# Patient Record
Sex: FEMALE | Race: OTHER
[De-identification: names, ages, dates, MRNs, and addresses within clinical notes are randomized per-mention and may not be internally consistent; named-entity substitution may affect disease eponyms.]

---

## 2018-01-27 ENCOUNTER — HOSPITAL ENCOUNTER (EMERGENCY)
Dept: HOSPITAL 54 - ER | Age: 73
Discharge: HOME | End: 2018-01-27
Payer: MEDICARE

## 2018-01-27 VITALS — DIASTOLIC BLOOD PRESSURE: 66 MMHG | SYSTOLIC BLOOD PRESSURE: 129 MMHG

## 2018-01-27 VITALS — BODY MASS INDEX: 29.45 KG/M2 | HEIGHT: 60 IN | WEIGHT: 150 LBS

## 2018-01-27 DIAGNOSIS — I10: ICD-10-CM
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
## 2018-01-27 DIAGNOSIS — Z88.0: ICD-10-CM
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
## 2018-01-27 DIAGNOSIS — S86.911A: Primary | ICD-10-CM
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
## 2018-01-27 DIAGNOSIS — Z79.82: ICD-10-CM

## 2018-01-27 PROCEDURE — Z7610: HCPCS

## 2018-01-27 PROCEDURE — A4606 OXYGEN PROBE USED W OXIMETER: HCPCS

## 2018-04-17 ENCOUNTER — HOSPITAL ENCOUNTER (EMERGENCY)
Dept: HOSPITAL 54 - ER | Age: 73
Discharge: HOME | End: 2018-04-17
Payer: MEDICARE

## 2018-04-17 VITALS — DIASTOLIC BLOOD PRESSURE: 97 MMHG | SYSTOLIC BLOOD PRESSURE: 126 MMHG

## 2018-04-17 VITALS — HEIGHT: 60 IN | BODY MASS INDEX: 29.45 KG/M2 | WEIGHT: 150 LBS

## 2018-04-17 DIAGNOSIS — F32.9: ICD-10-CM

## 2018-04-17 DIAGNOSIS — R59.0: Primary | ICD-10-CM

## 2018-04-17 DIAGNOSIS — I50.9: ICD-10-CM

## 2018-04-17 DIAGNOSIS — I11.0: ICD-10-CM

## 2018-04-17 DIAGNOSIS — Z79.82: ICD-10-CM

## 2018-04-17 DIAGNOSIS — Z88.0: ICD-10-CM

## 2018-04-17 DIAGNOSIS — F41.9: ICD-10-CM

## 2018-04-17 PROCEDURE — Z7610: HCPCS

## 2018-04-17 PROCEDURE — A4606 OXYGEN PROBE USED W OXIMETER: HCPCS

## 2018-12-21 ENCOUNTER — HOSPITAL ENCOUNTER (EMERGENCY)
Dept: HOSPITAL 54 - ER | Age: 73
LOS: 1 days | Discharge: HOME | End: 2018-12-22
Payer: MEDICARE

## 2018-12-21 VITALS — BODY MASS INDEX: 29.23 KG/M2 | HEIGHT: 63 IN | WEIGHT: 165 LBS

## 2018-12-21 DIAGNOSIS — Z98.890: ICD-10-CM
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
## 2018-12-21 DIAGNOSIS — S06.0X9A: Primary | ICD-10-CM
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
## 2018-12-21 DIAGNOSIS — M54.2: ICD-10-CM

## 2018-12-21 DIAGNOSIS — Z88.0: ICD-10-CM

## 2018-12-21 DIAGNOSIS — Z90.49: ICD-10-CM

## 2018-12-21 DIAGNOSIS — F41.9: ICD-10-CM

## 2018-12-21 DIAGNOSIS — Z90.710: ICD-10-CM

## 2018-12-21 DIAGNOSIS — Z79.899: ICD-10-CM
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
## 2018-12-21 DIAGNOSIS — Z79.82: ICD-10-CM

## 2018-12-21 DIAGNOSIS — F32.9: ICD-10-CM

## 2018-12-21 DIAGNOSIS — S00.83XA: ICD-10-CM

## 2018-12-21 DIAGNOSIS — I10: ICD-10-CM
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
## 2018-12-21 PROCEDURE — A4606 OXYGEN PROBE USED W OXIMETER: HCPCS

## 2018-12-21 PROCEDURE — Z7610: HCPCS

## 2018-12-22 VITALS — SYSTOLIC BLOOD PRESSURE: 128 MMHG | DIASTOLIC BLOOD PRESSURE: 74 MMHG

## 2019-04-13 ENCOUNTER — HOSPITAL ENCOUNTER (INPATIENT)
Dept: HOSPITAL 54 - ER | Age: 74
LOS: 1 days | Discharge: HOME | DRG: 313 | End: 2019-04-14
Attending: NURSE PRACTITIONER | Admitting: NURSE PRACTITIONER
Payer: MEDICARE

## 2019-04-13 VITALS — WEIGHT: 153.5 LBS | HEIGHT: 59 IN | BODY MASS INDEX: 30.95 KG/M2

## 2019-04-13 DIAGNOSIS — R07.89: Primary | ICD-10-CM

## 2019-04-13 DIAGNOSIS — D63.8: ICD-10-CM

## 2019-04-13 DIAGNOSIS — D68.59: ICD-10-CM

## 2019-04-13 DIAGNOSIS — E78.5: ICD-10-CM

## 2019-04-13 DIAGNOSIS — I48.91: ICD-10-CM

## 2019-04-13 DIAGNOSIS — I10: ICD-10-CM

## 2019-04-13 DIAGNOSIS — N17.0: ICD-10-CM

## 2019-04-13 LAB
BASOPHILS # BLD AUTO: 0 /CMM (ref 0–0.2)
BASOPHILS NFR BLD AUTO: 0.6 % (ref 0–2)
BUN SERPL-MCNC: 26 MG/DL (ref 7–18)
CALCIUM SERPL-MCNC: 9.3 MG/DL (ref 8.5–10.1)
CHLORIDE SERPL-SCNC: 104 MMOL/L (ref 98–107)
CO2 SERPL-SCNC: 29 MMOL/L (ref 21–32)
CREAT SERPL-MCNC: 0.9 MG/DL (ref 0.6–1.3)
EOSINOPHIL NFR BLD AUTO: 1.2 % (ref 0–6)
GLUCOSE SERPL-MCNC: 115 MG/DL (ref 74–106)
HCT VFR BLD AUTO: 33 % (ref 33–45)
HGB BLD-MCNC: 10.9 G/DL (ref 11.5–14.8)
LYMPHOCYTES NFR BLD AUTO: 1.8 /CMM (ref 0.8–4.8)
LYMPHOCYTES NFR BLD AUTO: 30.9 % (ref 20–44)
MCHC RBC AUTO-ENTMCNC: 33 G/DL (ref 31–36)
MCV RBC AUTO: 81 FL (ref 82–100)
MONOCYTES NFR BLD AUTO: 0.4 /CMM (ref 0.1–1.3)
MONOCYTES NFR BLD AUTO: 7.6 % (ref 2–12)
NEUTROPHILS # BLD AUTO: 3.5 /CMM (ref 1.8–8.9)
NEUTROPHILS NFR BLD AUTO: 59.7 % (ref 43–81)
PLATELET # BLD AUTO: 179 /CMM (ref 150–450)
POTASSIUM SERPL-SCNC: 4 MMOL/L (ref 3.5–5.1)
RBC # BLD AUTO: 4.02 MIL/UL (ref 4–5.2)
SODIUM SERPL-SCNC: 141 MMOL/L (ref 136–145)
WBC NRBC COR # BLD AUTO: 5.9 K/UL (ref 4.3–11)

## 2019-04-13 PROCEDURE — G0378 HOSPITAL OBSERVATION PER HR: HCPCS

## 2019-04-14 VITALS — DIASTOLIC BLOOD PRESSURE: 79 MMHG | SYSTOLIC BLOOD PRESSURE: 117 MMHG

## 2019-04-14 VITALS — SYSTOLIC BLOOD PRESSURE: 114 MMHG | DIASTOLIC BLOOD PRESSURE: 49 MMHG

## 2019-04-14 VITALS — SYSTOLIC BLOOD PRESSURE: 116 MMHG | DIASTOLIC BLOOD PRESSURE: 52 MMHG

## 2019-04-14 VITALS — SYSTOLIC BLOOD PRESSURE: 117 MMHG | DIASTOLIC BLOOD PRESSURE: 61 MMHG

## 2019-08-28 ENCOUNTER — OFFICE VISIT (OUTPATIENT)
Dept: URBAN - METROPOLITAN AREA CLINIC 62 | Facility: CLINIC | Age: 74
End: 2019-08-28
Payer: COMMERCIAL

## 2019-08-28 DIAGNOSIS — H43.811 VITREOUS DEGENERATION, RIGHT EYE: Chronic | ICD-10-CM

## 2019-08-28 PROCEDURE — 92004 COMPRE OPH EXAM NEW PT 1/>: CPT | Performed by: OPTOMETRIST

## 2019-08-28 ASSESSMENT — INTRAOCULAR PRESSURE
OS: 15
OD: 14

## 2019-08-28 ASSESSMENT — VISUAL ACUITY
OS: 20/25
OD: 20/25

## 2019-08-28 ASSESSMENT — KERATOMETRY
OD: 42.13
OS: 42.75

## 2019-08-28 NOTE — IMPRESSION/PLAN
Impression: Vitreous degeneration, right eye: H43.811. Plan: Posterior vitreous detachment accounts for the patient's complaints. Discussed signs and symptoms of PVD/floaters. Signs and symptoms of retinal detachment were discussed in detail. There is no evidence of retina pathology. No treatment is required at this time. Patient instructed to call immediately if any signs or symptoms of retinal detachments occur.

## 2019-08-28 NOTE — IMPRESSION/PLAN
Impression: Presence of intraocular lens: Z96.1. Plan: Stable, monitor for changes. Rec. updating glasses to improve vision.

## 2020-02-20 ENCOUNTER — HOSPITAL ENCOUNTER (EMERGENCY)
Dept: HOSPITAL 54 - ER | Age: 75
Discharge: HOME | End: 2020-02-20
Payer: MEDICARE

## 2020-02-20 VITALS — WEIGHT: 150 LBS | HEIGHT: 59 IN | BODY MASS INDEX: 30.24 KG/M2

## 2020-02-20 VITALS — SYSTOLIC BLOOD PRESSURE: 154 MMHG | DIASTOLIC BLOOD PRESSURE: 70 MMHG

## 2020-02-20 DIAGNOSIS — R06.02: ICD-10-CM

## 2020-02-20 DIAGNOSIS — Z90.49: ICD-10-CM

## 2020-02-20 DIAGNOSIS — R50.9: ICD-10-CM

## 2020-02-20 DIAGNOSIS — R05: Primary | ICD-10-CM

## 2020-02-20 DIAGNOSIS — R11.0: ICD-10-CM

## 2020-02-20 DIAGNOSIS — Z90.710: ICD-10-CM

## 2020-02-20 DIAGNOSIS — Z79.899: ICD-10-CM

## 2020-02-20 DIAGNOSIS — Z88.0: ICD-10-CM

## 2020-02-20 DIAGNOSIS — I48.91: ICD-10-CM

## 2020-02-20 DIAGNOSIS — Z79.82: ICD-10-CM

## 2020-02-20 DIAGNOSIS — I10: ICD-10-CM

## 2020-05-28 ENCOUNTER — HOSPITAL ENCOUNTER (EMERGENCY)
Dept: HOSPITAL 54 - ER | Age: 75
Discharge: HOME | End: 2020-05-28
Payer: MEDICARE

## 2020-05-28 VITALS — BODY MASS INDEX: 32.36 KG/M2 | WEIGHT: 150 LBS | HEIGHT: 57 IN

## 2020-05-28 VITALS — SYSTOLIC BLOOD PRESSURE: 135 MMHG | DIASTOLIC BLOOD PRESSURE: 76 MMHG

## 2020-05-28 DIAGNOSIS — Z90.710: ICD-10-CM

## 2020-05-28 DIAGNOSIS — I48.91: ICD-10-CM

## 2020-05-28 DIAGNOSIS — Z79.82: ICD-10-CM

## 2020-05-28 DIAGNOSIS — R51: Primary | ICD-10-CM

## 2020-05-28 DIAGNOSIS — Z79.899: ICD-10-CM

## 2020-05-28 DIAGNOSIS — Z90.49: ICD-10-CM

## 2020-05-28 DIAGNOSIS — Z88.0: ICD-10-CM

## 2020-05-28 DIAGNOSIS — I10: ICD-10-CM

## 2020-05-28 LAB
BASOPHILS # BLD AUTO: 0.1 /CMM (ref 0–0.2)
BASOPHILS NFR BLD AUTO: 1.8 % (ref 0–2)
BUN SERPL-MCNC: 15 MG/DL (ref 7–18)
CALCIUM SERPL-MCNC: 9.4 MG/DL (ref 8.5–10.1)
CHLORIDE SERPL-SCNC: 101 MMOL/L (ref 98–107)
CO2 SERPL-SCNC: 31 MMOL/L (ref 21–32)
CREAT SERPL-MCNC: 0.9 MG/DL (ref 0.6–1.3)
EOSINOPHIL NFR BLD AUTO: 1.4 % (ref 0–6)
GLUCOSE SERPL-MCNC: 133 MG/DL (ref 74–106)
HCT VFR BLD AUTO: 36 % (ref 33–45)
HGB BLD-MCNC: 11.6 G/DL (ref 11.5–14.8)
LYMPHOCYTES NFR BLD AUTO: 0.9 /CMM (ref 0.8–4.8)
LYMPHOCYTES NFR BLD AUTO: 18.7 % (ref 20–44)
MCHC RBC AUTO-ENTMCNC: 33 G/DL (ref 31–36)
MCV RBC AUTO: 83 FL (ref 82–100)
MONOCYTES NFR BLD AUTO: 0.3 /CMM (ref 0.1–1.3)
MONOCYTES NFR BLD AUTO: 6.9 % (ref 2–12)
NEUTROPHILS # BLD AUTO: 3.5 /CMM (ref 1.8–8.9)
NEUTROPHILS NFR BLD AUTO: 71.2 % (ref 43–81)
PLATELET # BLD AUTO: 196 /CMM (ref 150–450)
POTASSIUM SERPL-SCNC: 3.8 MMOL/L (ref 3.5–5.1)
RBC # BLD AUTO: 4.29 MIL/UL (ref 4–5.2)
SODIUM SERPL-SCNC: 140 MMOL/L (ref 136–145)
WBC NRBC COR # BLD AUTO: 4.9 K/UL (ref 4.3–11)

## 2020-05-28 PROCEDURE — 36415 COLL VENOUS BLD VENIPUNCTURE: CPT

## 2020-05-28 PROCEDURE — 96374 THER/PROPH/DIAG INJ IV PUSH: CPT

## 2020-05-28 PROCEDURE — 80048 BASIC METABOLIC PNL TOTAL CA: CPT

## 2020-05-28 PROCEDURE — 85025 COMPLETE CBC W/AUTO DIFF WBC: CPT

## 2020-05-28 PROCEDURE — 99283 EMERGENCY DEPT VISIT LOW MDM: CPT

## 2020-05-28 RX ADMIN — PROCHLORPERAZINE EDISYLATE ONE MG: 5 INJECTION INTRAMUSCULAR; INTRAVENOUS at 11:31

## 2020-05-28 RX ADMIN — Medication ONE MG: at 11:31

## 2020-10-23 ENCOUNTER — OFFICE VISIT (OUTPATIENT)
Dept: URBAN - METROPOLITAN AREA CLINIC 62 | Facility: CLINIC | Age: 75
End: 2020-10-23
Payer: MEDICARE

## 2020-10-23 DIAGNOSIS — Z96.1 PRESENCE OF INTRAOCULAR LENS: Chronic | ICD-10-CM

## 2020-10-23 DIAGNOSIS — H04.123 DRY EYE SYNDROME OF BILATERAL LACRIMAL GLANDS: Primary | ICD-10-CM

## 2020-10-23 DIAGNOSIS — H43.813 VITREOUS DEGENERATION, BILATERAL: Chronic | ICD-10-CM

## 2020-10-23 PROCEDURE — 99214 OFFICE O/P EST MOD 30 MIN: CPT | Performed by: OPTOMETRIST

## 2020-10-23 ASSESSMENT — KERATOMETRY
OD: 42.38
OS: 42.88

## 2020-10-23 ASSESSMENT — INTRAOCULAR PRESSURE
OD: 12
OS: 12

## 2020-10-23 ASSESSMENT — VISUAL ACUITY
OS: 20/25
OD: 20/25

## 2020-10-23 NOTE — IMPRESSION/PLAN
Impression: Dry eye syndrome of bilateral lacrimal glands: H04.123. Plan: Discussed diagnosis in detail with patient. Dry eye accounts for the patient's symptoms. Dry eye is a chronic condition and does not have a cure and will need artificial tears for maintenance. There is no evidence of permanent changes to the cornea. Continue with preservative free artificial tears OU. Monitor for changes.

## 2021-02-22 ENCOUNTER — HOSPITAL ENCOUNTER (EMERGENCY)
Dept: HOSPITAL 54 - ER | Age: 76
LOS: 1 days | Discharge: HOME | End: 2021-02-23
Payer: MEDICARE

## 2021-02-22 VITALS — BODY MASS INDEX: 29.45 KG/M2 | WEIGHT: 150 LBS | HEIGHT: 60 IN

## 2021-02-22 DIAGNOSIS — T88.1XXA: Primary | ICD-10-CM

## 2021-02-22 DIAGNOSIS — I10: ICD-10-CM

## 2021-02-22 DIAGNOSIS — Z90.710: ICD-10-CM

## 2021-02-22 DIAGNOSIS — Z88.0: ICD-10-CM

## 2021-02-22 DIAGNOSIS — Z90.49: ICD-10-CM

## 2021-02-22 DIAGNOSIS — M79.18: ICD-10-CM

## 2021-02-22 DIAGNOSIS — Z79.899: ICD-10-CM

## 2021-02-22 DIAGNOSIS — Z79.82: ICD-10-CM

## 2021-02-22 LAB
BASOPHILS # BLD AUTO: 0 /CMM (ref 0–0.2)
BASOPHILS NFR BLD AUTO: 0.5 % (ref 0–2)
EOSINOPHIL NFR BLD AUTO: 5 % (ref 0–6)
HCT VFR BLD AUTO: 35 % (ref 33–45)
HGB BLD-MCNC: 11.5 G/DL (ref 11.5–14.8)
LYMPHOCYTES NFR BLD AUTO: 1.7 /CMM (ref 0.8–4.8)
LYMPHOCYTES NFR BLD AUTO: 38 % (ref 20–44)
MCHC RBC AUTO-ENTMCNC: 32 G/DL (ref 31–36)
MCV RBC AUTO: 84 FL (ref 82–100)
MONOCYTES NFR BLD AUTO: 0.4 /CMM (ref 0.1–1.3)
MONOCYTES NFR BLD AUTO: 9.4 % (ref 2–12)
NEUTROPHILS # BLD AUTO: 2.1 /CMM (ref 1.8–8.9)
NEUTROPHILS NFR BLD AUTO: 47.1 % (ref 43–81)
PLATELET # BLD AUTO: 183 /CMM (ref 150–450)
RBC # BLD AUTO: 4.22 MIL/UL (ref 4–5.2)
WBC NRBC COR # BLD AUTO: 4.5 K/UL (ref 4.3–11)

## 2021-02-22 PROCEDURE — 85025 COMPLETE CBC W/AUTO DIFF WBC: CPT

## 2021-02-22 PROCEDURE — 99283 EMERGENCY DEPT VISIT LOW MDM: CPT

## 2021-02-22 PROCEDURE — 36415 COLL VENOUS BLD VENIPUNCTURE: CPT

## 2021-02-22 PROCEDURE — 80048 BASIC METABOLIC PNL TOTAL CA: CPT

## 2021-02-23 VITALS — DIASTOLIC BLOOD PRESSURE: 61 MMHG | SYSTOLIC BLOOD PRESSURE: 134 MMHG

## 2021-02-23 LAB
BUN SERPL-MCNC: 22 MG/DL (ref 7–18)
CALCIUM SERPL-MCNC: 9.5 MG/DL (ref 8.5–10.1)
CHLORIDE SERPL-SCNC: 104 MMOL/L (ref 98–107)
CO2 SERPL-SCNC: 30 MMOL/L (ref 21–32)
CREAT SERPL-MCNC: 0.9 MG/DL (ref 0.6–1.3)
GLUCOSE SERPL-MCNC: 104 MG/DL (ref 74–106)
POTASSIUM SERPL-SCNC: 4 MMOL/L (ref 3.5–5.1)
SODIUM SERPL-SCNC: 141 MMOL/L (ref 136–145)

## 2021-12-16 ENCOUNTER — HOSPITAL ENCOUNTER (EMERGENCY)
Dept: HOSPITAL 54 - ER | Age: 76
Discharge: HOME | End: 2021-12-16
Payer: MEDICARE

## 2021-12-16 VITALS — SYSTOLIC BLOOD PRESSURE: 135 MMHG | DIASTOLIC BLOOD PRESSURE: 70 MMHG

## 2021-12-16 VITALS — WEIGHT: 153 LBS | BODY MASS INDEX: 30.84 KG/M2 | HEIGHT: 59 IN

## 2021-12-16 DIAGNOSIS — R07.89: Primary | ICD-10-CM

## 2021-12-16 DIAGNOSIS — Z79.899: ICD-10-CM

## 2021-12-16 DIAGNOSIS — Z88.0: ICD-10-CM

## 2021-12-16 DIAGNOSIS — F41.9: ICD-10-CM

## 2021-12-16 LAB
BASOPHILS # BLD AUTO: 0 K/UL (ref 0–0.2)
BASOPHILS NFR BLD AUTO: 0.7 % (ref 0–2)
BUN SERPL-MCNC: 16 MG/DL (ref 7–18)
CALCIUM SERPL-MCNC: 9.1 MG/DL (ref 8.5–10.1)
CHLORIDE SERPL-SCNC: 101 MMOL/L (ref 98–107)
CO2 SERPL-SCNC: 29 MMOL/L (ref 21–32)
CREAT SERPL-MCNC: 1 MG/DL (ref 0.6–1.3)
EOSINOPHIL NFR BLD AUTO: 1.2 % (ref 0–6)
GLUCOSE SERPL-MCNC: 106 MG/DL (ref 74–106)
HCT VFR BLD AUTO: 37 % (ref 33–45)
HGB BLD-MCNC: 11.7 G/DL (ref 11.5–14.8)
LYMPHOCYTES NFR BLD AUTO: 1.9 K/UL (ref 0.8–4.8)
LYMPHOCYTES NFR BLD AUTO: 27 % (ref 20–44)
MCHC RBC AUTO-ENTMCNC: 32 G/DL (ref 31–36)
MCV RBC AUTO: 84 FL (ref 82–100)
MONOCYTES NFR BLD AUTO: 0.5 K/UL (ref 0.1–1.3)
MONOCYTES NFR BLD AUTO: 7.7 % (ref 2–12)
NEUTROPHILS # BLD AUTO: 4.5 K/UL (ref 1.8–8.9)
NEUTROPHILS NFR BLD AUTO: 63.4 % (ref 43–81)
PLATELET # BLD AUTO: 233 K/UL (ref 150–450)
POTASSIUM SERPL-SCNC: 3.9 MMOL/L (ref 3.5–5.1)
RBC # BLD AUTO: 4.36 MIL/UL (ref 4–5.2)
SODIUM SERPL-SCNC: 137 MMOL/L (ref 136–145)
WBC NRBC COR # BLD AUTO: 7.2 K/UL (ref 4.3–11)

## 2021-12-16 RX ADMIN — LORAZEPAM ONE MG: 0.5 TABLET ORAL at 20:15

## 2022-02-01 ENCOUNTER — HOSPITAL ENCOUNTER (EMERGENCY)
Dept: HOSPITAL 54 - ER | Age: 77
Discharge: HOME | End: 2022-02-01
Payer: MEDICARE

## 2022-02-01 VITALS — DIASTOLIC BLOOD PRESSURE: 76 MMHG | SYSTOLIC BLOOD PRESSURE: 135 MMHG

## 2022-02-01 VITALS — WEIGHT: 150 LBS | BODY MASS INDEX: 29.45 KG/M2 | HEIGHT: 60 IN

## 2022-02-01 DIAGNOSIS — Z98.890: ICD-10-CM

## 2022-02-01 DIAGNOSIS — Z88.0: ICD-10-CM

## 2022-02-01 DIAGNOSIS — Z90.49: ICD-10-CM

## 2022-02-01 DIAGNOSIS — Z79.82: ICD-10-CM

## 2022-02-01 DIAGNOSIS — R07.89: Primary | ICD-10-CM

## 2022-02-01 DIAGNOSIS — Z79.899: ICD-10-CM

## 2022-02-01 DIAGNOSIS — I10: ICD-10-CM

## 2022-02-01 DIAGNOSIS — I48.91: ICD-10-CM

## 2022-02-01 LAB
BASOPHILS # BLD AUTO: 0 K/UL (ref 0–0.2)
BASOPHILS NFR BLD AUTO: 0.4 % (ref 0–2)
BUN SERPL-MCNC: 13 MG/DL (ref 7–18)
CALCIUM SERPL-MCNC: 9.5 MG/DL (ref 8.5–10.1)
CHLORIDE SERPL-SCNC: 101 MMOL/L (ref 98–107)
CO2 SERPL-SCNC: 29 MMOL/L (ref 21–32)
CREAT SERPL-MCNC: 1 MG/DL (ref 0.6–1.3)
EOSINOPHIL NFR BLD AUTO: 1.7 % (ref 0–6)
GLUCOSE SERPL-MCNC: 112 MG/DL (ref 74–106)
HCT VFR BLD AUTO: 36 % (ref 33–45)
HGB BLD-MCNC: 11.7 G/DL (ref 11.5–14.8)
LYMPHOCYTES NFR BLD AUTO: 1.4 K/UL (ref 0.8–4.8)
LYMPHOCYTES NFR BLD AUTO: 25.5 % (ref 20–44)
MCHC RBC AUTO-ENTMCNC: 33 G/DL (ref 31–36)
MCV RBC AUTO: 83 FL (ref 82–100)
MONOCYTES NFR BLD AUTO: 0.4 K/UL (ref 0.1–1.3)
MONOCYTES NFR BLD AUTO: 7.9 % (ref 2–12)
NEUTROPHILS # BLD AUTO: 3.4 K/UL (ref 1.8–8.9)
NEUTROPHILS NFR BLD AUTO: 64.5 % (ref 43–81)
PLATELET # BLD AUTO: 208 K/UL (ref 150–450)
POTASSIUM SERPL-SCNC: 4 MMOL/L (ref 3.5–5.1)
RBC # BLD AUTO: 4.28 MIL/UL (ref 4–5.2)
SODIUM SERPL-SCNC: 138 MMOL/L (ref 136–145)
WBC NRBC COR # BLD AUTO: 5.3 K/UL (ref 4.3–11)

## 2022-02-01 PROCEDURE — 99285 EMERGENCY DEPT VISIT HI MDM: CPT

## 2022-02-01 PROCEDURE — 83880 ASSAY OF NATRIURETIC PEPTIDE: CPT

## 2022-02-01 PROCEDURE — 36415 COLL VENOUS BLD VENIPUNCTURE: CPT

## 2022-02-01 PROCEDURE — 80048 BASIC METABOLIC PNL TOTAL CA: CPT

## 2022-02-01 PROCEDURE — 93005 ELECTROCARDIOGRAM TRACING: CPT

## 2022-02-01 PROCEDURE — 71045 X-RAY EXAM CHEST 1 VIEW: CPT

## 2022-02-01 PROCEDURE — 84484 ASSAY OF TROPONIN QUANT: CPT

## 2022-02-01 PROCEDURE — 96374 THER/PROPH/DIAG INJ IV PUSH: CPT

## 2022-02-01 PROCEDURE — 85025 COMPLETE CBC W/AUTO DIFF WBC: CPT

## 2024-05-14 ENCOUNTER — HOSPITAL ENCOUNTER (EMERGENCY)
Dept: HOSPITAL 54 - ER | Age: 79
Discharge: HOME | End: 2024-05-14
Payer: MEDICARE

## 2024-05-14 VITALS — BODY MASS INDEX: 27.6 KG/M2 | HEIGHT: 62 IN | WEIGHT: 150 LBS

## 2024-05-14 VITALS — DIASTOLIC BLOOD PRESSURE: 44 MMHG | OXYGEN SATURATION: 100 % | SYSTOLIC BLOOD PRESSURE: 140 MMHG | TEMPERATURE: 98.7 F

## 2024-05-14 DIAGNOSIS — R07.89: ICD-10-CM

## 2024-05-14 DIAGNOSIS — R06.02: Primary | ICD-10-CM

## 2024-05-14 DIAGNOSIS — Z90.49: ICD-10-CM

## 2024-05-14 DIAGNOSIS — Z86.79: ICD-10-CM

## 2024-05-14 DIAGNOSIS — Z90.710: ICD-10-CM

## 2024-05-14 DIAGNOSIS — D64.9: ICD-10-CM

## 2024-05-14 DIAGNOSIS — I10: ICD-10-CM

## 2024-05-14 DIAGNOSIS — Z88.0: ICD-10-CM

## 2024-05-14 LAB
ALBUMIN SERPL BCP-MCNC: 3.5 G/DL (ref 3.4–5)
ALP SERPL-CCNC: 152 U/L (ref 46–116)
ALT SERPL W P-5'-P-CCNC: 25 U/L (ref 12–78)
AST SERPL W P-5'-P-CCNC: 22 U/L (ref 15–37)
BASOPHILS # BLD AUTO: 0 K/UL (ref 0–0.2)
BASOPHILS NFR BLD AUTO: 0.6 % (ref 0–2)
BILIRUB DIRECT SERPL-MCNC: 0.1 MG/DL (ref 0–0.2)
BILIRUB SERPL-MCNC: 0.3 MG/DL (ref 0.2–1)
BUN SERPL-MCNC: 16 MG/DL (ref 7–18)
CALCIUM SERPL-MCNC: 8.8 MG/DL (ref 8.5–10.1)
CHLORIDE SERPL-SCNC: 105 MMOL/L (ref 98–107)
CO2 SERPL-SCNC: 30 MMOL/L (ref 21–32)
CREAT SERPL-MCNC: 1.1 MG/DL (ref 0.6–1.3)
EOSINOPHIL # BLD AUTO: 0.1 K/UL (ref 0–0.7)
EOSINOPHIL NFR BLD AUTO: 2.2 % (ref 0–6)
ERYTHROCYTE [DISTWIDTH] IN BLOOD BY AUTOMATED COUNT: 13.9 % (ref 11.5–15)
GLUCOSE SERPL-MCNC: 110 MG/DL (ref 74–106)
HCT VFR BLD AUTO: 34 % (ref 33–45)
HGB BLD-MCNC: 11 G/DL (ref 11.5–14.8)
LYMPHOCYTES NFR BLD AUTO: 1.4 K/UL (ref 0.8–4.8)
LYMPHOCYTES NFR BLD AUTO: 28.2 % (ref 20–44)
MCH RBC QN AUTO: 26 PG (ref 26–33)
MCHC RBC AUTO-ENTMCNC: 32 G/DL (ref 31–36)
MCV RBC AUTO: 81 FL (ref 82–100)
MONOCYTES NFR BLD AUTO: 0.4 K/UL (ref 0.1–1.3)
MONOCYTES NFR BLD AUTO: 7.9 % (ref 2–12)
NEUTROPHILS # BLD AUTO: 3.1 K/UL (ref 1.8–8.9)
NEUTROPHILS NFR BLD AUTO: 61.1 % (ref 43–81)
NT-PROBNP SERPL-MCNC: 684 PG/ML (ref 0–125)
PLATELET # BLD AUTO: 183 K/UL (ref 150–450)
POTASSIUM SERPL-SCNC: 3.8 MMOL/L (ref 3.5–5.1)
PROT SERPL-MCNC: 7.8 G/DL (ref 6.4–8.2)
RBC # BLD AUTO: 4.23 MIL/UL (ref 4–5.2)
SODIUM SERPL-SCNC: 139 MMOL/L (ref 136–145)
WBC NRBC COR # BLD AUTO: 5 K/UL (ref 4.3–11)

## 2025-03-03 ENCOUNTER — HOSPITAL ENCOUNTER (INPATIENT)
Dept: HOSPITAL 54 - ER | Age: 80
LOS: 2 days | Discharge: HOME | DRG: 153 | End: 2025-03-05
Attending: INTERNAL MEDICINE | Admitting: INTERNAL MEDICINE
Payer: MEDICARE

## 2025-03-03 VITALS — OXYGEN SATURATION: 95 % | DIASTOLIC BLOOD PRESSURE: 78 MMHG | SYSTOLIC BLOOD PRESSURE: 122 MMHG | TEMPERATURE: 98.1 F

## 2025-03-03 VITALS — WEIGHT: 138 LBS | HEIGHT: 66 IN | BODY MASS INDEX: 22.18 KG/M2

## 2025-03-03 VITALS — DIASTOLIC BLOOD PRESSURE: 95 MMHG | TEMPERATURE: 98 F | OXYGEN SATURATION: 98 % | SYSTOLIC BLOOD PRESSURE: 125 MMHG

## 2025-03-03 DIAGNOSIS — F41.9: ICD-10-CM

## 2025-03-03 DIAGNOSIS — Z20.822: ICD-10-CM

## 2025-03-03 DIAGNOSIS — I48.91: ICD-10-CM

## 2025-03-03 DIAGNOSIS — I10: ICD-10-CM

## 2025-03-03 DIAGNOSIS — J11.1: Primary | ICD-10-CM

## 2025-03-03 DIAGNOSIS — Z87.891: ICD-10-CM

## 2025-03-03 DIAGNOSIS — Z88.0: ICD-10-CM

## 2025-03-03 DIAGNOSIS — D64.9: ICD-10-CM

## 2025-03-03 DIAGNOSIS — Z79.01: ICD-10-CM

## 2025-03-03 DIAGNOSIS — G47.00: ICD-10-CM

## 2025-03-03 DIAGNOSIS — Z90.710: ICD-10-CM

## 2025-03-03 DIAGNOSIS — Z90.49: ICD-10-CM

## 2025-03-03 DIAGNOSIS — Z91.148: ICD-10-CM

## 2025-03-03 DIAGNOSIS — E78.5: ICD-10-CM

## 2025-03-03 DIAGNOSIS — F32.A: ICD-10-CM

## 2025-03-03 DIAGNOSIS — Z79.899: ICD-10-CM

## 2025-03-03 LAB
ALBUMIN SERPL BCP-MCNC: 3.5 G/DL (ref 3.4–5)
ALP SERPL-CCNC: 150 U/L (ref 46–116)
ALT SERPL W P-5'-P-CCNC: 28 U/L (ref 12–78)
APTT PPP: 23.9 SEC (ref 24.3–34.3)
AST SERPL W P-5'-P-CCNC: 22 U/L (ref 15–37)
BASOPHILS # BLD AUTO: 0.1 K/UL (ref 0–0.2)
BASOPHILS NFR BLD AUTO: 1.2 % (ref 0–2)
BILIRUB DIRECT SERPL-MCNC: 0.1 MG/DL (ref 0–0.2)
BILIRUB SERPL-MCNC: 0.3 MG/DL (ref 0.2–1)
BUN SERPL-MCNC: 10 MG/DL (ref 7–18)
CALCIUM SERPL-MCNC: 10.3 MG/DL (ref 8.5–10.1)
CHLORIDE SERPL-SCNC: 106 MMOL/L (ref 98–107)
CO2 SERPL-SCNC: 31 MMOL/L (ref 21–32)
CREAT SERPL-MCNC: 1 MG/DL (ref 0.6–1.3)
EOSINOPHIL # BLD AUTO: 0.2 K/UL (ref 0–0.7)
EOSINOPHIL NFR BLD AUTO: 3.7 % (ref 0–6)
ERYTHROCYTE [DISTWIDTH] IN BLOOD BY AUTOMATED COUNT: 13.4 % (ref 11.5–15)
GLUCOSE SERPL-MCNC: 122 MG/DL (ref 74–106)
HCT VFR BLD AUTO: 35 % (ref 33–45)
HGB BLD-MCNC: 11.3 G/DL (ref 11.5–14.8)
INR PPP: 1.07 (ref 0.91–1.1)
LYMPHOCYTES NFR BLD AUTO: 0.8 K/UL (ref 0.8–4.8)
LYMPHOCYTES NFR BLD AUTO: 16.2 % (ref 20–44)
MCH RBC QN AUTO: 27 PG (ref 26–33)
MCHC RBC AUTO-ENTMCNC: 33 G/DL (ref 31–36)
MCV RBC AUTO: 81 FL (ref 82–100)
MONOCYTES NFR BLD AUTO: 0.4 K/UL (ref 0.1–1.3)
MONOCYTES NFR BLD AUTO: 7.3 % (ref 2–12)
NEUTROPHILS # BLD AUTO: 3.7 K/UL (ref 1.8–8.9)
NEUTROPHILS NFR BLD AUTO: 71.6 % (ref 43–81)
NT-PROBNP SERPL-MCNC: 908 PG/ML (ref 0–125)
PLATELET # BLD AUTO: 296 K/UL (ref 150–450)
POTASSIUM SERPL-SCNC: 4.1 MMOL/L (ref 3.5–5.1)
PROT SERPL-MCNC: 8.1 G/DL (ref 6.4–8.2)
PROTHROMBIN TIME: 11.3 SECS (ref 9.2–11.1)
RBC # BLD AUTO: 4.25 MIL/UL (ref 4–5.2)
SODIUM SERPL-SCNC: 141 MMOL/L (ref 136–145)
TSH SERPL DL<=0.005 MIU/L-ACNC: 2.99 UIU/ML (ref 0.36–3.74)
WBC NRBC COR # BLD AUTO: 5.1 K/UL (ref 4.3–11)

## 2025-03-03 PROCEDURE — G0378 HOSPITAL OBSERVATION PER HR: HCPCS

## 2025-03-03 RX ADMIN — SODIUM CHLORIDE ONE ML: 9 INJECTION, SOLUTION INTRAVENOUS at 12:11

## 2025-03-03 RX ADMIN — TRAZODONE HYDROCHLORIDE SCH MG: 50 TABLET ORAL at 22:34

## 2025-03-03 RX ADMIN — DEXTROSE MONOHYDRATE ONE MG: 50 INJECTION, SOLUTION INTRAVENOUS at 13:26

## 2025-03-03 RX ADMIN — METOPROLOL SUCCINATE SCH MG: 50 TABLET, EXTENDED RELEASE ORAL at 14:19

## 2025-03-03 RX ADMIN — DILTIAZEM HYDROCHLORIDE SCH MG: 30 TABLET, FILM COATED ORAL at 17:47

## 2025-03-03 RX ADMIN — APIXABAN SCH MG: 5 TABLET, FILM COATED ORAL at 16:25

## 2025-03-04 VITALS — OXYGEN SATURATION: 97 % | DIASTOLIC BLOOD PRESSURE: 82 MMHG | TEMPERATURE: 98.2 F | SYSTOLIC BLOOD PRESSURE: 93 MMHG

## 2025-03-04 VITALS — DIASTOLIC BLOOD PRESSURE: 64 MMHG | TEMPERATURE: 97.9 F | SYSTOLIC BLOOD PRESSURE: 115 MMHG | OXYGEN SATURATION: 100 %

## 2025-03-04 VITALS — TEMPERATURE: 98.4 F | OXYGEN SATURATION: 97 % | DIASTOLIC BLOOD PRESSURE: 70 MMHG | SYSTOLIC BLOOD PRESSURE: 120 MMHG

## 2025-03-04 VITALS — TEMPERATURE: 98.2 F | OXYGEN SATURATION: 99 % | SYSTOLIC BLOOD PRESSURE: 97 MMHG | DIASTOLIC BLOOD PRESSURE: 55 MMHG

## 2025-03-04 VITALS — TEMPERATURE: 98.1 F | DIASTOLIC BLOOD PRESSURE: 63 MMHG | SYSTOLIC BLOOD PRESSURE: 131 MMHG | OXYGEN SATURATION: 100 %

## 2025-03-04 VITALS — OXYGEN SATURATION: 100 % | SYSTOLIC BLOOD PRESSURE: 134 MMHG | TEMPERATURE: 97.9 F | DIASTOLIC BLOOD PRESSURE: 95 MMHG

## 2025-03-04 VITALS — OXYGEN SATURATION: 100 %

## 2025-03-04 VITALS — OXYGEN SATURATION: 96 %

## 2025-03-04 LAB
ALBUMIN SERPL BCP-MCNC: 3.6 G/DL (ref 3.4–5)
ALP SERPL-CCNC: 134 U/L (ref 46–116)
ALT SERPL W P-5'-P-CCNC: 16 U/L (ref 12–78)
AST SERPL W P-5'-P-CCNC: 29 U/L (ref 15–37)
BASOPHILS # BLD AUTO: 0 K/UL (ref 0–0.2)
BASOPHILS NFR BLD AUTO: 0.7 % (ref 0–2)
BILIRUB SERPL-MCNC: 0.5 MG/DL (ref 0.2–1)
BUN SERPL-MCNC: 17 MG/DL (ref 7–18)
CALCIUM SERPL-MCNC: 10.2 MG/DL (ref 8.5–10.1)
CHLORIDE SERPL-SCNC: 104 MMOL/L (ref 98–107)
CO2 SERPL-SCNC: 23 MMOL/L (ref 21–32)
CREAT SERPL-MCNC: 1.1 MG/DL (ref 0.6–1.3)
EOSINOPHIL # BLD AUTO: 0.3 K/UL (ref 0–0.7)
EOSINOPHIL NFR BLD AUTO: 3.8 % (ref 0–6)
ERYTHROCYTE [DISTWIDTH] IN BLOOD BY AUTOMATED COUNT: 13.7 % (ref 11.5–15)
GLUCOSE SERPL-MCNC: 107 MG/DL (ref 74–106)
HCT VFR BLD AUTO: 42 % (ref 33–45)
HGB BLD-MCNC: 13.4 G/DL (ref 11.5–14.8)
LYMPHOCYTES NFR BLD AUTO: 2.2 K/UL (ref 0.8–4.8)
LYMPHOCYTES NFR BLD AUTO: 33.1 % (ref 20–44)
MAGNESIUM SERPL-MCNC: 2.5 MG/DL (ref 1.8–2.4)
MCH RBC QN AUTO: 27 PG (ref 26–33)
MCHC RBC AUTO-ENTMCNC: 32 G/DL (ref 31–36)
MCV RBC AUTO: 84 FL (ref 82–100)
MONOCYTES NFR BLD AUTO: 0.5 K/UL (ref 0.1–1.3)
MONOCYTES NFR BLD AUTO: 7.3 % (ref 2–12)
NEUTROPHILS # BLD AUTO: 3.6 K/UL (ref 1.8–8.9)
NEUTROPHILS NFR BLD AUTO: 55.1 % (ref 43–81)
PHOSPHATE SERPL-MCNC: 3.4 MG/DL (ref 2.5–4.9)
PLATELET # BLD AUTO: 266 K/UL (ref 150–450)
POTASSIUM SERPL-SCNC: 4.6 MMOL/L (ref 3.5–5.1)
PROT SERPL-MCNC: 8.5 G/DL (ref 6.4–8.2)
RBC # BLD AUTO: 4.93 MIL/UL (ref 4–5.2)
SODIUM SERPL-SCNC: 139 MMOL/L (ref 136–145)
WBC NRBC COR # BLD AUTO: 6.5 K/UL (ref 4.3–11)

## 2025-03-04 RX ADMIN — LEVALBUTEROL HYDROCHLORIDE SCH MG: 1.25 SOLUTION, CONCENTRATE RESPIRATORY (INHALATION) at 11:30

## 2025-03-04 RX ADMIN — METOPROLOL SUCCINATE SCH MG: 50 TABLET, EXTENDED RELEASE ORAL at 08:45

## 2025-03-04 RX ADMIN — DILTIAZEM HYDROCHLORIDE SCH MG: 30 TABLET, FILM COATED ORAL at 12:00

## 2025-03-04 RX ADMIN — AMLODIPINE BESYLATE SCH MG: 5 TABLET ORAL at 08:46

## 2025-03-04 RX ADMIN — Medication PRN LOZ: at 12:13

## 2025-03-05 VITALS — TEMPERATURE: 98.2 F | OXYGEN SATURATION: 99 % | DIASTOLIC BLOOD PRESSURE: 64 MMHG | SYSTOLIC BLOOD PRESSURE: 148 MMHG

## 2025-03-05 VITALS — OXYGEN SATURATION: 96 % | SYSTOLIC BLOOD PRESSURE: 138 MMHG | TEMPERATURE: 97.7 F | DIASTOLIC BLOOD PRESSURE: 71 MMHG

## 2025-03-05 VITALS — SYSTOLIC BLOOD PRESSURE: 148 MMHG | DIASTOLIC BLOOD PRESSURE: 64 MMHG

## 2025-03-05 VITALS — DIASTOLIC BLOOD PRESSURE: 60 MMHG | SYSTOLIC BLOOD PRESSURE: 141 MMHG | OXYGEN SATURATION: 99 % | TEMPERATURE: 97.7 F

## 2025-03-05 VITALS — OXYGEN SATURATION: 97 %

## 2025-03-05 VITALS — OXYGEN SATURATION: 100 %

## 2025-04-07 ENCOUNTER — HOSPITAL ENCOUNTER (EMERGENCY)
Dept: HOSPITAL 54 - ER | Age: 80
Discharge: HOME | End: 2025-04-07
Payer: MEDICARE

## 2025-04-07 VITALS — HEIGHT: 59 IN | WEIGHT: 156 LBS | BODY MASS INDEX: 31.45 KG/M2

## 2025-04-07 VITALS — SYSTOLIC BLOOD PRESSURE: 154 MMHG | TEMPERATURE: 98.3 F | DIASTOLIC BLOOD PRESSURE: 90 MMHG | OXYGEN SATURATION: 99 %

## 2025-04-07 DIAGNOSIS — I48.91: ICD-10-CM

## 2025-04-07 DIAGNOSIS — Z90.710: ICD-10-CM

## 2025-04-07 DIAGNOSIS — H10.31: Primary | ICD-10-CM

## 2025-04-07 DIAGNOSIS — Z88.0: ICD-10-CM

## 2025-04-07 DIAGNOSIS — Z90.49: ICD-10-CM

## 2025-04-07 DIAGNOSIS — I10: ICD-10-CM

## 2025-04-07 DIAGNOSIS — Z86.73: ICD-10-CM

## 2025-04-07 DIAGNOSIS — Z79.899: ICD-10-CM

## 2025-04-07 DIAGNOSIS — Z79.01: ICD-10-CM
